# Patient Record
Sex: FEMALE | Race: WHITE | URBAN - METROPOLITAN AREA
[De-identification: names, ages, dates, MRNs, and addresses within clinical notes are randomized per-mention and may not be internally consistent; named-entity substitution may affect disease eponyms.]

---

## 2020-02-12 ENCOUNTER — APPOINTMENT (RX ONLY)
Dept: URBAN - METROPOLITAN AREA CLINIC 345 | Facility: CLINIC | Age: 50
Setting detail: DERMATOLOGY
End: 2020-02-12

## 2020-02-12 DIAGNOSIS — L85.3 XEROSIS CUTIS: ICD-10-CM

## 2020-02-12 DIAGNOSIS — D22 MELANOCYTIC NEVI: ICD-10-CM

## 2020-02-12 DIAGNOSIS — D18.0 HEMANGIOMA: ICD-10-CM

## 2020-02-12 DIAGNOSIS — L57.8 OTHER SKIN CHANGES DUE TO CHRONIC EXPOSURE TO NONIONIZING RADIATION: ICD-10-CM

## 2020-02-12 PROBLEM — D18.01 HEMANGIOMA OF SKIN AND SUBCUTANEOUS TISSUE: Status: ACTIVE | Noted: 2020-02-12

## 2020-02-12 PROBLEM — D22.5 MELANOCYTIC NEVI OF TRUNK: Status: ACTIVE | Noted: 2020-02-12

## 2020-02-12 PROCEDURE — 99214 OFFICE O/P EST MOD 30 MIN: CPT

## 2020-02-12 PROCEDURE — ? COUNSELING

## 2020-02-12 ASSESSMENT — LOCATION ZONE DERM
LOCATION ZONE: TRUNK
LOCATION ZONE: FACE

## 2020-02-12 ASSESSMENT — LOCATION SIMPLE DESCRIPTION DERM
LOCATION SIMPLE: RIGHT CHEEK
LOCATION SIMPLE: RIGHT LOWER BACK
LOCATION SIMPLE: LEFT BREAST
LOCATION SIMPLE: RIGHT BREAST
LOCATION SIMPLE: ABDOMEN

## 2020-02-12 ASSESSMENT — LOCATION DETAILED DESCRIPTION DERM
LOCATION DETAILED: RIGHT MEDIAL BREAST 4-5:00 REGION
LOCATION DETAILED: LEFT MEDIAL BREAST 7-8:00 REGION
LOCATION DETAILED: RIGHT MEDIAL MALAR CHEEK
LOCATION DETAILED: EPIGASTRIC SKIN
LOCATION DETAILED: RIGHT SUPERIOR LATERAL MIDBACK

## 2021-02-11 ENCOUNTER — APPOINTMENT (RX ONLY)
Dept: URBAN - METROPOLITAN AREA CLINIC 10 | Facility: CLINIC | Age: 51
Setting detail: DERMATOLOGY
End: 2021-02-11

## 2021-02-11 VITALS — TEMPERATURE: 98.1 F

## 2021-02-11 DIAGNOSIS — L57.8 OTHER SKIN CHANGES DUE TO CHRONIC EXPOSURE TO NONIONIZING RADIATION: ICD-10-CM

## 2021-02-11 DIAGNOSIS — D22 MELANOCYTIC NEVI: ICD-10-CM

## 2021-02-11 PROBLEM — D22.9 MELANOCYTIC NEVI, UNSPECIFIED: Status: ACTIVE | Noted: 2021-02-11

## 2021-02-11 PROCEDURE — ? SUNSCREEN TREATMENT REGIMEN

## 2021-02-11 PROCEDURE — 99213 OFFICE O/P EST LOW 20 MIN: CPT

## 2021-02-11 PROCEDURE — ? SUNSCREEN RECOMMENDATIONS

## 2021-02-11 PROCEDURE — ? COUNSELING

## 2021-02-11 NOTE — PROCEDURE: SUNSCREEN RECOMMENDATIONS
General Sunscreen Counseling: I recommended a broad spectrum sunscreen with a SPF of 30 or higher.  I explained that SPF 30 sunscreens block approximately 97 percent of the sun's harmful rays.  Sunscreens should be applied at least 15 minutes prior to expected sun exposure and then every 2 hours after that as long as sun exposure continues. If swimming or exercising sunscreen should be reapplied every 45 minutes to an hour after getting wet or sweating.  One ounce, or the equivalent of a shot glass full of sunscreen, is adequate to protect the skinof a normal sized adult. I also recommended a lip balm with a broad spectrum sunscreen as well. Sun protective clothing can be used in lieu of sunscreen but must be worn the entire time you are exposed to the sun's rays.
Detail Level: Detailed

## 2021-09-28 ENCOUNTER — APPOINTMENT (RX ONLY)
Dept: URBAN - METROPOLITAN AREA CLINIC 10 | Facility: CLINIC | Age: 51
Setting detail: DERMATOLOGY
End: 2021-09-28

## 2021-09-28 DIAGNOSIS — D22 MELANOCYTIC NEVI: ICD-10-CM

## 2021-09-28 DIAGNOSIS — L57.8 OTHER SKIN CHANGES DUE TO CHRONIC EXPOSURE TO NONIONIZING RADIATION: ICD-10-CM

## 2021-09-28 DIAGNOSIS — L82.1 OTHER SEBORRHEIC KERATOSIS: ICD-10-CM

## 2021-09-28 PROBLEM — D22.9 MELANOCYTIC NEVI, UNSPECIFIED: Status: ACTIVE | Noted: 2021-09-28

## 2021-09-28 PROCEDURE — ? SUNSCREEN TREATMENT REGIMEN

## 2021-09-28 PROCEDURE — 99213 OFFICE O/P EST LOW 20 MIN: CPT

## 2021-09-28 PROCEDURE — ? COUNSELING

## 2022-10-18 ENCOUNTER — APPOINTMENT (RX ONLY)
Dept: URBAN - METROPOLITAN AREA CLINIC 10 | Facility: CLINIC | Age: 52
Setting detail: DERMATOLOGY
End: 2022-10-18

## 2022-10-18 DIAGNOSIS — D22 MELANOCYTIC NEVI: ICD-10-CM | Status: STABLE

## 2022-10-18 DIAGNOSIS — L82.0 INFLAMED SEBORRHEIC KERATOSIS: ICD-10-CM | Status: STABLE

## 2022-10-18 DIAGNOSIS — L57.8 OTHER SKIN CHANGES DUE TO CHRONIC EXPOSURE TO NONIONIZING RADIATION: ICD-10-CM | Status: STABLE

## 2022-10-18 DIAGNOSIS — L81.4 OTHER MELANIN HYPERPIGMENTATION: ICD-10-CM | Status: STABLE

## 2022-10-18 DIAGNOSIS — D18.0 HEMANGIOMA: ICD-10-CM | Status: STABLE

## 2022-10-18 DIAGNOSIS — L82.1 OTHER SEBORRHEIC KERATOSIS: ICD-10-CM | Status: STABLE

## 2022-10-18 PROBLEM — D22.5 MELANOCYTIC NEVI OF TRUNK: Status: ACTIVE | Noted: 2022-10-18

## 2022-10-18 PROBLEM — D18.01 HEMANGIOMA OF SKIN AND SUBCUTANEOUS TISSUE: Status: ACTIVE | Noted: 2022-10-18

## 2022-10-18 PROCEDURE — ? SUNSCREEN RECOMMENDATIONS

## 2022-10-18 PROCEDURE — ? FULL BODY SKIN EXAM

## 2022-10-18 PROCEDURE — ? COUNSELING

## 2022-10-18 PROCEDURE — ? SUNSCREEN TREATMENT REGIMEN

## 2022-10-18 PROCEDURE — 17110 DESTRUCTION B9 LES UP TO 14: CPT

## 2022-10-18 PROCEDURE — 99213 OFFICE O/P EST LOW 20 MIN: CPT | Mod: 25

## 2022-10-18 PROCEDURE — ? LIQUID NITROGEN

## 2022-10-18 PROCEDURE — ? ADDITIONAL NOTES

## 2022-10-18 ASSESSMENT — LOCATION SIMPLE DESCRIPTION DERM
LOCATION SIMPLE: LEFT SHOULDER
LOCATION SIMPLE: LEFT ANTERIOR NECK
LOCATION SIMPLE: LEFT LOWER BACK
LOCATION SIMPLE: ABDOMEN
LOCATION SIMPLE: RIGHT BACK
LOCATION SIMPLE: LEFT BREAST

## 2022-10-18 ASSESSMENT — LOCATION ZONE DERM
LOCATION ZONE: TRUNK
LOCATION ZONE: NECK
LOCATION ZONE: ARM

## 2022-10-18 ASSESSMENT — LOCATION DETAILED DESCRIPTION DERM
LOCATION DETAILED: LEFT SUPERIOR MEDIAL MIDBACK
LOCATION DETAILED: LEFT MEDIAL BREAST 10-11:00 REGION
LOCATION DETAILED: RIGHT SUPERIOR LATERAL UPPER BACK
LOCATION DETAILED: LEFT LATERAL ABDOMEN
LOCATION DETAILED: LEFT ANTERIOR SHOULDER
LOCATION DETAILED: LEFT INFERIOR ANTERIOR NECK

## 2022-10-18 NOTE — PROCEDURE: MIPS QUALITY
Quality 130: Documentation Of Current Medications In The Medical Record: Current Medications Documented
Quality 226: Preventive Care And Screening: Tobacco Use: Screening And Cessation Intervention: Patient screened for tobacco use and is an ex/non-smoker
Quality 431: Preventive Care And Screening: Unhealthy Alcohol Use - Screening: Patient not identified as an unhealthy alcohol user when screened for unhealthy alcohol use using a systematic screening method
Detail Level: Detailed
Quality 110: Preventive Care And Screening: Influenza Immunization: Influenza Immunization previously received during influenza season
Quality 402: Tobacco Use And Help With Quitting Among Adolescents: Patient screened for tobacco and never smoked
Quality 134: Screening For Clinical Depression And Follow-Up Plan: The patient was screened for depression and the screen was negative and no follow up required

## 2022-10-18 NOTE — PROCEDURE: LIQUID NITROGEN
Show Topical Anesthesia Variable?: Yes
Medical Necessity Information: It is in your best interest to select a reason for this procedure from the list below. All of these items fulfill various CMS LCD requirements except the new and changing color options.
Add 52 Modifier (Optional): no
Consent: The patient's consent was obtained including but not limited to risks of crusting, scabbing, blistering, scarring, darker or lighter pigmentary change, recurrence, incomplete removal and infection.
Spray Paint Text: The liquid nitrogen was applied to the skin utilizing a spray paint frosting technique.
Medical Necessity Clause: This procedure was medically necessary because the lesions that were treated were:
Duration Of Freeze Thaw-Cycle (Seconds): 5-10
Detail Level: Detailed
Number Of Freeze-Thaw Cycles: 2 freeze-thaw cycles
Post-Care Instructions: I reviewed with the patient in detail post-care instructions. Patient is to wear sunprotection, and avoid picking at any of the treated lesions. Pt may apply Vaseline to crusted or scabbing areas.

## 2024-02-01 ENCOUNTER — APPOINTMENT (RX ONLY)
Dept: URBAN - METROPOLITAN AREA CLINIC 10 | Facility: CLINIC | Age: 54
Setting detail: DERMATOLOGY
End: 2024-02-01

## 2024-02-01 DIAGNOSIS — D22 MELANOCYTIC NEVI: ICD-10-CM | Status: STABLE

## 2024-02-01 DIAGNOSIS — L57.8 OTHER SKIN CHANGES DUE TO CHRONIC EXPOSURE TO NONIONIZING RADIATION: ICD-10-CM | Status: STABLE

## 2024-02-01 PROBLEM — D22.5 MELANOCYTIC NEVI OF TRUNK: Status: ACTIVE | Noted: 2024-02-01

## 2024-02-01 PROCEDURE — ? FULL BODY SKIN EXAM

## 2024-02-01 PROCEDURE — ? TREATMENT REGIMEN

## 2024-02-01 PROCEDURE — ? PRESCRIPTION MEDICATION MANAGEMENT

## 2024-02-01 PROCEDURE — ? COUNSELING

## 2024-02-01 PROCEDURE — 99213 OFFICE O/P EST LOW 20 MIN: CPT

## 2024-02-01 ASSESSMENT — LOCATION DETAILED DESCRIPTION DERM
LOCATION DETAILED: LEFT MEDIAL FOREHEAD
LOCATION DETAILED: RIGHT CENTRAL BUCCAL CHEEK
LOCATION DETAILED: RIGHT LATERAL ABDOMEN

## 2024-02-01 ASSESSMENT — LOCATION ZONE DERM
LOCATION ZONE: FACE
LOCATION ZONE: TRUNK

## 2024-02-01 ASSESSMENT — LOCATION SIMPLE DESCRIPTION DERM
LOCATION SIMPLE: ABDOMEN
LOCATION SIMPLE: RIGHT CHEEK
LOCATION SIMPLE: LEFT FOREHEAD

## 2024-02-01 NOTE — PROCEDURE: PRESCRIPTION MEDICATION MANAGEMENT
Initiate Treatment: Tret .05%  2x weekly at night, increase as tolerated.
Detail Level: Simple
Plan: Pt bought in clinic today
Render In Strict Bullet Format?: No

## 2025-02-06 ENCOUNTER — APPOINTMENT (OUTPATIENT)
Dept: URBAN - METROPOLITAN AREA CLINIC 10 | Facility: CLINIC | Age: 55
Setting detail: DERMATOLOGY
End: 2025-02-06

## 2025-02-06 DIAGNOSIS — L57.8 OTHER SKIN CHANGES DUE TO CHRONIC EXPOSURE TO NONIONIZING RADIATION: ICD-10-CM | Status: STABLE

## 2025-02-06 DIAGNOSIS — D22 MELANOCYTIC NEVI: ICD-10-CM | Status: STABLE

## 2025-02-06 PROBLEM — D22.5 MELANOCYTIC NEVI OF TRUNK: Status: ACTIVE | Noted: 2025-02-06

## 2025-02-06 PROCEDURE — ? COUNSELING

## 2025-02-06 PROCEDURE — 99213 OFFICE O/P EST LOW 20 MIN: CPT

## 2025-02-06 PROCEDURE — ? PHOTO-DOCUMENTATION

## 2025-02-06 PROCEDURE — ? FULL BODY SKIN EXAM

## 2025-02-06 PROCEDURE — ? TREATMENT REGIMEN

## 2025-02-06 ASSESSMENT — LOCATION ZONE DERM
LOCATION ZONE: FACE
LOCATION ZONE: TRUNK

## 2025-02-06 ASSESSMENT — LOCATION SIMPLE DESCRIPTION DERM
LOCATION SIMPLE: ABDOMEN
LOCATION SIMPLE: RIGHT CHEEK

## 2025-02-06 ASSESSMENT — LOCATION DETAILED DESCRIPTION DERM
LOCATION DETAILED: RIGHT LATERAL ABDOMEN
LOCATION DETAILED: RIGHT CENTRAL BUCCAL CHEEK

## 2025-02-06 NOTE — HPI: EVALUATION OF SKIN LESION(S)
What Type Of Note Output Would You Prefer (Optional)?: Bullet Format
Hpi Title: Evaluation of Skin Lesions
Additional History: No concerns today.

## 2025-07-10 ENCOUNTER — RX ONLY (RX ONLY)
Age: 55
End: 2025-07-10

## 2025-07-10 RX ORDER — TRETIONIN 0.25 MG/G
0.025% CREAM TOPICAL QHS
Qty: 45 | Refills: 5 | Status: ERX | COMMUNITY
Start: 2025-07-10